# Patient Record
Sex: MALE | Race: WHITE | Employment: FULL TIME | URBAN - METROPOLITAN AREA
[De-identification: names, ages, dates, MRNs, and addresses within clinical notes are randomized per-mention and may not be internally consistent; named-entity substitution may affect disease eponyms.]

---

## 2020-03-27 ENCOUNTER — HOSPITAL ENCOUNTER (EMERGENCY)
Age: 25
Discharge: HOME | End: 2020-03-27
Attending: EMERGENCY MEDICINE
Payer: MEDICAID

## 2020-03-27 VITALS
SYSTOLIC BLOOD PRESSURE: 123 MMHG | HEART RATE: 83 BPM | TEMPERATURE: 98 F | RESPIRATION RATE: 12 BRPM | HEIGHT: 71 IN | OXYGEN SATURATION: 98 % | WEIGHT: 165 LBS | BODY MASS INDEX: 23.1 KG/M2 | DIASTOLIC BLOOD PRESSURE: 82 MMHG

## 2020-03-27 DIAGNOSIS — T15.92XA FOREIGN BODY, EYE, LEFT, INITIAL ENCOUNTER: Primary | ICD-10-CM

## 2020-03-27 PROCEDURE — 74011000250 HC RX REV CODE- 250: Performed by: EMERGENCY MEDICINE

## 2020-03-27 PROCEDURE — 74011250637 HC RX REV CODE- 250/637: Performed by: EMERGENCY MEDICINE

## 2020-03-27 PROCEDURE — 99283 EMERGENCY DEPT VISIT LOW MDM: CPT

## 2020-03-27 RX ORDER — KETOROLAC TROMETHAMINE 5 MG/ML
1 SOLUTION OPHTHALMIC 2 TIMES DAILY
Qty: 1 BOTTLE | Refills: 0 | Status: SHIPPED | OUTPATIENT
Start: 2020-03-27 | End: 2020-04-06

## 2020-03-27 RX ORDER — TETRACAINE HYDROCHLORIDE 5 MG/ML
1 SOLUTION OPHTHALMIC
Status: COMPLETED | OUTPATIENT
Start: 2020-03-27 | End: 2020-03-27

## 2020-03-27 RX ORDER — ERYTHROMYCIN 5 MG/G
OINTMENT OPHTHALMIC
Qty: 1 TUBE | Refills: 0 | Status: SHIPPED | OUTPATIENT
Start: 2020-03-27

## 2020-03-27 RX ORDER — ERYTHROMYCIN 5 MG/G
OINTMENT OPHTHALMIC
Status: COMPLETED | OUTPATIENT
Start: 2020-03-27 | End: 2020-03-27

## 2020-03-27 RX ORDER — KETOROLAC TROMETHAMINE 5 MG/ML
1 SOLUTION OPHTHALMIC
Status: DISCONTINUED | OUTPATIENT
Start: 2020-03-27 | End: 2020-03-27

## 2020-03-27 RX ADMIN — ERYTHROMYCIN: 5 OINTMENT OPHTHALMIC at 23:07

## 2020-03-27 RX ADMIN — TETRACAINE HYDROCHLORIDE 1 DROP: 5 SOLUTION OPHTHALMIC at 22:33

## 2020-03-27 RX ADMIN — FLUORESCEIN SODIUM 1 STRIP: 1 STRIP OPHTHALMIC at 22:32

## 2020-03-28 NOTE — ED NOTES
I have reviewed discharge instructions with the patient. The patient verbalized understanding. Patient left ED via Discharge Method: ambulatory to Home with self. Opportunity for questions and clarification provided. Patient given 2 scripts. To continue your aftercare when you leave the hospital, you may receive an automated call from our care team to check in on how you are doing. This is a free service and part of our promise to provide the best care and service to meet your aftercare needs.  If you have questions, or wish to unsubscribe from this service please call [phone]. Thank you for Choosing [facility].

## 2020-03-28 NOTE — ED NOTES
Visual Acuity    Left eyes with glasses 20/25  Right eye with glasses 20/25  Both eyes with glasses 20/25

## 2020-03-28 NOTE — ED TRIAGE NOTES
Pt states he got something stuck in his left eye.  States this started yesterday and that they tried to flush out what they could see but it is not coming out

## 2020-03-28 NOTE — ED PROVIDER NOTES
Patient with a foreign body in his left eye which he can see in the mirror. Small speck of something lower portion of the iris. Does work around metal but not did not feel anything suddenly injure his eye while working. Wears goggles and glasses at work. Had some mild irritation last night after work. Continued today getting worse so came in. No blurry vision. Mild redness. The history is provided by the patient. No  was used. Eye Pain    This is a new problem. The current episode started yesterday. The problem occurs constantly. The problem has been gradually worsening. The left eye is affected. The injury mechanism was none. The pain is moderate. There is no history of trauma to the eye. There is no known exposure to pink eye. He does not wear contacts. Associated symptoms include foreign body sensation, eye redness and pain. Pertinent negatives include no numbness, no decreased vision, no discharge, no photophobia, no nausea, no vomiting, no weakness and no fever. The treatment provided no relief. No past medical history on file. No past surgical history on file. No family history on file.     Social History     Socioeconomic History    Marital status: SINGLE     Spouse name: Not on file    Number of children: Not on file    Years of education: Not on file    Highest education level: Not on file   Occupational History    Not on file   Social Needs    Financial resource strain: Not on file    Food insecurity     Worry: Not on file     Inability: Not on file    Transportation needs     Medical: Not on file     Non-medical: Not on file   Tobacco Use    Smoking status: Not on file   Substance and Sexual Activity    Alcohol use: Not on file    Drug use: Not on file    Sexual activity: Not on file   Lifestyle    Physical activity     Days per week: Not on file     Minutes per session: Not on file    Stress: Not on file   Relationships    Social connections Talks on phone: Not on file     Gets together: Not on file     Attends [employer] service: Not on file     Active member of club or organization: Not on file     Attends meetings of clubs or organizations: Not on file     Relationship status: Not on file    Intimate partner violence     Fear of current or ex partner: Not on file     Emotionally abused: Not on file     Physically abused: Not on file     Forced sexual activity: Not on file   Other Topics Concern    Not on file   Social History Narrative    Not on file         ALLERGIES: Patient has no known allergies. Review of Systems   Constitutional: Negative for chills and fever. HENT: Negative for rhinorrhea and sore throat. Eyes: Positive for pain and redness. Negative for photophobia and discharge. Respiratory: Negative for chest tightness, shortness of breath and wheezing. Cardiovascular: Negative for chest pain and leg swelling. Gastrointestinal: Negative for abdominal pain, diarrhea, nausea and vomiting. Musculoskeletal: Negative for back pain, gait problem, neck pain and neck stiffness. Skin: Negative for color change and rash. Neurological: Negative for weakness, numbness and headaches. Vitals:    03/27/20 2225   BP: 127/87   Pulse: 83   Resp: 16   Temp: 98 °F (36.7 °C)   SpO2: 98%   Weight: 74.8 kg (165 lb)   Height: 5' 11\" (1.803 m)            Physical Exam  Constitutional:       Appearance: He is well-developed. HENT:      Head: Normocephalic and atraumatic. Eyes:      General: Lids are normal. Vision grossly intact. Left eye: Foreign body present. No discharge. Extraocular Movements: Extraocular movements intact. Pupils: Pupils are equal, round, and reactive to light. Left eye: No corneal abrasion or fluorescein uptake. Slit lamp exam:     Left eye: Foreign body present. No corneal flare or corneal ulcer.         Comments: 20/25 in each eye and bilateral.    Cardiovascular:      Rate and Rhythm: Normal rate and regular rhythm. Pulmonary:      Effort: Pulmonary effort is normal.      Breath sounds: Normal breath sounds. Abdominal:      General: Bowel sounds are normal.      Palpations: Abdomen is soft. Tenderness: There is no abdominal tenderness. Musculoskeletal: Normal range of motion. Skin:     General: Skin is warm and dry. Neurological:      Mental Status: He is alert and oriented to person, place, and time. MDM  Number of Diagnoses or Management Options  Diagnosis management comments: Attempted to remove foreign object from left eye with Q-tip but unsuccessful. Will start patient on erythromycin ointment and have him follow-up with ophthalmology.     Patient Progress  Patient progress: stable         Procedures

## 2020-03-28 NOTE — DISCHARGE INSTRUCTIONS
Patient Education        Object in the Eye: Care Instructions  Your Care Instructions  It is common for a speck of dirt or a small object, such as an eyelash or an insect, to get in the eye. Usually your tears wash the object out. But the speck can scratch the surface of the eye (cornea). If the eye surface is scratched, it can feel as if something is still in the eye. Most surface scratches are minor and heal on their own in a day or two. If the object was still in your eye, your doctor probably removed it during your exam. Sometimes these objects become stuck deep in the eye and require more treatment. For instance, a metal object may leave a rust ring. Follow-up care is a key part of your treatment and safety. Be sure to make and go to all appointments, and call your doctor if you are having problems. It's also a good idea to know your test results and keep a list of the medicines you take. How can you care for yourself at home? · The doctor probably used medicine to numb your eye. When it wears off in 30 to 60 minutes, your eye pain may come back. Take over-the-counter pain medicine, such as acetaminophen (Tylenol), ibuprofen (Advil, Motrin), or naproxen (Aleve), as needed. Read and follow all instructions on the label. · Do not take two or more pain medicines at the same time unless the doctor told you to. Many pain medicines have acetaminophen, which is Tylenol. Too much acetaminophen (Tylenol) can be harmful. · If your doctor prescribed antibiotics, take them as directed. Do not stop taking them just because you feel better. You need to take the full course of antibiotics. · The doctor may have put a patch over your injured eye. If so, keep your eye closed under the patch. This will make your eye feel better. Do not remove the patch until your doctor has told you to. · If you do not have a patch, keep your hurt eye closed to reduce pain. · Wash your hands before touching your eye.   · Do not rub your injured eye. Rubbing can make it worse. · Use the prescribed eyedrops or ointment as directed. Be sure the dropper or bottle tip is clean. · To put in eyedrops or ointment:  ? Tilt your head back, and pull your lower eyelid down with one finger. ? Drop or squirt the medicine inside the lower lid. ? Close your eye for 30 to 60 seconds to let the drops or ointment move around. ? Do not touch the ointment or dropper tip to your eyelashes or any other surface. · Do not use a contact lens in your hurt eye until your doctor says you can. Also, do not wear eye makeup until your eye heals. · Do not drive if you are wearing an eye patch. You cannot  distances well. · For the first 24 to 48 hours, limit reading and other tasks that require a lot of eye movement. · Bright light may hurt. It may help to wear dark glasses. · To prevent eye injuries in the future, wear safety glasses or goggles when you work with machines or tools, mow the lawn, or ride a bike or motorcycle. When should you call for help? Call 911 anytime you think you may need emergency care. For example, call if:    · You suddenly cannot see or can barely see.    Call your doctor now or seek immediate medical care if:    · You have signs of infection in the eye, such as:  ? Pus or thick discharge coming from the eye.  ? Redness or swelling around the eye.  ? A fever.     · You have new or increasing eye pain.     · It feels like sand is in your eye when you blink.    Watch closely for changes in your health, and be sure to contact your doctor if:    · You are not getting better after 1 day (24 hours). Where can you learn more? Go to http://erick-preet.info/  Enter [id number] in the search box to learn more about \"Object in the Eye: Care Instructions. \"  Current as of: June 26, 2019Content Version: 12.4  © 8725-0909 [employer].   Care instructions adapted under license by [employer] (which disclaims liability or warranty for this information). If you have questions about a medical condition or this instruction, always ask your healthcare professional. [place] [address] any warranty or liability for your use of this information.